# Patient Record
Sex: MALE | Race: WHITE | Employment: FULL TIME | URBAN - METROPOLITAN AREA
[De-identification: names, ages, dates, MRNs, and addresses within clinical notes are randomized per-mention and may not be internally consistent; named-entity substitution may affect disease eponyms.]

---

## 2017-11-30 ENCOUNTER — OFFICE VISIT (OUTPATIENT)
Dept: INTERNAL MEDICINE CLINIC | Facility: CLINIC | Age: 32
End: 2017-11-30

## 2017-11-30 VITALS
HEART RATE: 65 BPM | WEIGHT: 205 LBS | BODY MASS INDEX: 29.68 KG/M2 | SYSTOLIC BLOOD PRESSURE: 110 MMHG | TEMPERATURE: 98 F | RESPIRATION RATE: 16 BRPM | DIASTOLIC BLOOD PRESSURE: 84 MMHG | OXYGEN SATURATION: 95 % | HEIGHT: 69.75 IN

## 2017-11-30 DIAGNOSIS — Z13.0 SCREENING FOR IRON DEFICIENCY ANEMIA: ICD-10-CM

## 2017-11-30 DIAGNOSIS — Z13.220 SCREENING FOR LIPOID DISORDERS: ICD-10-CM

## 2017-11-30 DIAGNOSIS — Z23 NEED FOR DIPHTHERIA-TETANUS-PERTUSSIS (TDAP) VACCINE, ADULT/ADOLESCENT: ICD-10-CM

## 2017-11-30 DIAGNOSIS — Z13.89 SCREENING FOR GENITOURINARY CONDITION: ICD-10-CM

## 2017-11-30 DIAGNOSIS — Z13.228 SCREENING FOR METABOLIC DISORDER: ICD-10-CM

## 2017-11-30 DIAGNOSIS — M67.432 GANGLION CYST OF DORSUM OF LEFT WRIST: ICD-10-CM

## 2017-11-30 DIAGNOSIS — E55.9 VITAMIN D DEFICIENCY: ICD-10-CM

## 2017-11-30 DIAGNOSIS — Z11.3 SCREENING FOR VENEREAL DISEASE: ICD-10-CM

## 2017-11-30 DIAGNOSIS — Z00.00 ENCOUNTER FOR ANNUAL PHYSICAL EXAM: Primary | ICD-10-CM

## 2017-11-30 DIAGNOSIS — Z13.29 SCREENING FOR THYROID DISORDER: ICD-10-CM

## 2017-11-30 PROCEDURE — 90715 TDAP VACCINE 7 YRS/> IM: CPT | Performed by: STUDENT IN AN ORGANIZED HEALTH CARE EDUCATION/TRAINING PROGRAM

## 2017-11-30 PROCEDURE — 99385 PREV VISIT NEW AGE 18-39: CPT | Performed by: STUDENT IN AN ORGANIZED HEALTH CARE EDUCATION/TRAINING PROGRAM

## 2017-11-30 PROCEDURE — 90471 IMMUNIZATION ADMIN: CPT | Performed by: STUDENT IN AN ORGANIZED HEALTH CARE EDUCATION/TRAINING PROGRAM

## 2017-11-30 NOTE — PATIENT INSTRUCTIONS
Ganglion Cyst: Hand    A ganglion cyst is a firm, fluid-filled lump that can suddenly appear on the front or back of the wrist or at the base of a finger.  These cysts grow from normal tissue in the wrist and fingers, and range in size from a pea to a pea To shrink the cyst, your provider may remove (aspirate) the fluid with a needle. If the cyst hurts, your provider may also give you an injection of an anti-inflammatory, such as cortisone, to relieve the irritation.  Your hand may then be wrapped to help ke High cholesterol or triglycerides All men ages 28 and older, and younger men at high risk for coronary artery disease At least every 5 years   HIV All men At routine exams   Obesity All men in this age group At routine exams   Syphilis Men at increased ris Pneumococca (PCV13) and Pneumococcal (PPSV23) Men at increased risk for infection – talk with your healthcare provider PCV13: 1 dose ages 23 to 72 (protects against 13 types of pneumococcal bacteria)  PPSV23: 1 to 2 doses through age 59, or 1 dose at 72 or

## 2017-11-30 NOTE — PROGRESS NOTES
[facility]    REASON FOR VISIT:    Luis Camacho is a 33year old male who presents for an [address]. Current Complaints: C/o left wrist mass for the past 18 months. Mass is non-tender, reports getting bigger slightly.   Sky Desir for adults born 1944-1 26 No results found for: HCVAB    Tuberculosis Screen If high risk No components found for: PPDINDURAT      ALLERGIES:   No Known Allergies    CURRENT MEDICATIONS:     No current outpatient prescriptions on file.    MEDICAL INFORMATI motion. No thyromegaly present. Cardiovascular: Normal rate, regular rhythm and normal heart sounds. Exam reveals no friction rub. No murmur heard. Pulmonary/Chest: Effort normal and breath sounds normal. He has no wheezes. He has no rales.    Abdominal prostate cancer screening. The patient indicates understanding of these issues and agrees to the plan. The patient is asked to return for annual physical in 12 months.     Ganglion cyst of dorsum of left wrist  -     ORTHOPEDIC - INTERNAL    Need for dipht then every 10 years   HPV Males 11-21   Zoster (Shingles) 60 and older: one dose   Varicella 2 doses if not immune   MMR 1-2 doses if born after 1955 and not immune     Indy Chaparro MD  11/30/2017  4:09 PM

## 2018-06-13 ENCOUNTER — OFFICE VISIT (OUTPATIENT)
Dept: INTERNAL MEDICINE CLINIC | Facility: CLINIC | Age: 33
End: 2018-06-13

## 2018-06-13 VITALS
BODY MASS INDEX: 27.2 KG/M2 | SYSTOLIC BLOOD PRESSURE: 124 MMHG | OXYGEN SATURATION: 97 % | HEART RATE: 60 BPM | HEIGHT: 70 IN | WEIGHT: 190 LBS | DIASTOLIC BLOOD PRESSURE: 86 MMHG | RESPIRATION RATE: 16 BRPM | TEMPERATURE: 98 F

## 2018-06-13 DIAGNOSIS — S20.212A CONTUSION OF LEFT CHEST WALL, INITIAL ENCOUNTER: Primary | ICD-10-CM

## 2018-06-13 PROCEDURE — 99213 OFFICE O/P EST LOW 20 MIN: CPT | Performed by: STUDENT IN AN ORGANIZED HEALTH CARE EDUCATION/TRAINING PROGRAM

## 2018-06-13 RX ORDER — NAPROXEN 500 MG/1
500 TABLET ORAL 2 TIMES DAILY WITH MEALS
Qty: 30 TABLET | Refills: 0 | Status: SHIPPED | OUTPATIENT
Start: 2018-06-13

## 2018-06-13 NOTE — PROGRESS NOTES
HPI:    Patient ID: Deborah Gamble is a 33year old male. Here with c/o left sided chest pain after being hit by other player while playing hockey 6 days ago. Trauma   This is a new problem. Episode onset: 6 days ago while playing ice hockey.  Pt was struc Constitutional: He is oriented to person, place, and time. He appears well-developed and well-nourished. HENT:   Head: Normocephalic and atraumatic.    Right Ear: External ear normal.   Left Ear: External ear normal.   Nose: Nose normal.   Mouth/Throat: O

## 2018-06-13 NOTE — PATIENT INSTRUCTIONS
Chest Contusion    A contusion is a bruise to the skin, muscle, or ribs. It may cause pain, tenderness, and swelling. It may turn the skin purple until it heals.  Contusions take a few days to a few weeks to heal.  Home care  Follow these guidelines when © 1992-9117 [employer] [address] [facility] [place] [place]. All rights reserved. This information is not intended as a substitute for professional medical care. Always follow your healthcare professional's instructions.

## 2018-06-15 PROBLEM — M67.432 GANGLION CYST OF VOLAR ASPECT OF LEFT WRIST: Status: ACTIVE | Noted: 2018-06-15

## 2018-08-24 ENCOUNTER — TELEPHONE (OUTPATIENT)
Dept: INTERNAL MEDICINE CLINIC | Facility: CLINIC | Age: 33
End: 2018-08-24

## 2018-09-06 NOTE — TELEPHONE ENCOUNTER
Medical records request rec'd from [address] [place] [address] for records dated 1/2016 to 8/2018. To [facility] 9-7-18. See med rec scan dated 9-6-18.

## 2018-09-10 NOTE — TELEPHONE ENCOUNTER
Addclarice[facility]l request for records from [address] [id number].  To Dilcia Nolasco

## 2018-09-17 NOTE — TELEPHONE ENCOUNTER
Incoming (mail or fax):  Fax  Received from:  [facility]   Documentation given to:  [email or web address] Fracisco Simmons Records Request for Status

## 2018-09-24 NOTE — TELEPHONE ENCOUNTER
Incoming (mail or fax):  fax  Received from:  [facility]  Documentation given to:  Hernandez Wasserman

## 2018-10-01 NOTE — TELEPHONE ENCOUNTER
Incoming (mail or fax):  Fax  Received from:  [facility]   Documentation given to:  Carlos Alberto Astorga

## 2018-10-09 NOTE — TELEPHONE ENCOUNTER
Per Kaleb Cerna at [facility], records will be sent to [facility] 10-9-18. No need to send duplicate requests at this time.

## 2018-10-09 NOTE — TELEPHONE ENCOUNTER
Per Favio Cardoza at [facility]", records have been sent to [facility] twice. Left message at [facility] of this, advised to call this office or [facility] with any further questions.

## 2022-03-05 ENCOUNTER — LAB REQUISITION (OUTPATIENT)
Dept: SURGERY | Age: 37
End: 2022-03-05
Payer: COMMERCIAL

## 2022-03-06 LAB — SARS-COV-2 RNA RESP QL NAA+PROBE: NOT DETECTED

## (undated) DIAGNOSIS — Z01.818 PREOP EXAMINATION: ICD-10-CM